# Patient Record
Sex: MALE | Race: OTHER | NOT HISPANIC OR LATINO | URBAN - METROPOLITAN AREA
[De-identification: names, ages, dates, MRNs, and addresses within clinical notes are randomized per-mention and may not be internally consistent; named-entity substitution may affect disease eponyms.]

---

## 2019-11-18 ENCOUNTER — EMERGENCY (EMERGENCY)
Facility: HOSPITAL | Age: 24
LOS: 1 days | Discharge: HOME | End: 2019-11-18
Attending: EMERGENCY MEDICINE
Payer: SELF-PAY

## 2019-11-18 VITALS
RESPIRATION RATE: 18 BRPM | HEIGHT: 69 IN | WEIGHT: 169.98 LBS | SYSTOLIC BLOOD PRESSURE: 132 MMHG | TEMPERATURE: 98 F | HEART RATE: 68 BPM | OXYGEN SATURATION: 99 % | DIASTOLIC BLOOD PRESSURE: 70 MMHG

## 2019-11-18 PROCEDURE — 99053 MED SERV 10PM-8AM 24 HR FAC: CPT

## 2019-11-18 PROCEDURE — 99284 EMERGENCY DEPT VISIT MOD MDM: CPT

## 2019-11-18 NOTE — ED ADULT TRIAGE NOTE - MEANS OF ARRIVAL
Refill policies:    • Allow 2-3 business days for refills; controlled substances may take longer.   • Contact your pharmacy at least 5 days prior to running out of medication and have them send an electronic request or submit request through the [facility] have a procedure or additional testing performed. [facility]) will contact your insurance carrier to obtain pre-certification or prior authorization.     Unfortunately, [facility]UL[facility] has seen an increase in denial of payment even though the p stretcher

## 2019-11-18 NOTE — ED ADULT NURSE NOTE - NSIMPLEMENTINTERV_GEN_ALL_ED
Implemented All Fall Risk Interventions:  [place] to call system. Call bell, personal items and telephone within reach. Instruct patient to call for assistance. Room bathroom lighting operational. Non-slip footwear when patient is off stretcher. Physically safe environment: no spills, clutter or unnecessary equipment. Stretcher in lowest position, wheels locked, appropriate side rails in place. Provide visual cue, wrist band, yellow gown, etc. Monitor gait and stability. Monitor for mental status changes and reorient to person, place, and time. Review medications for side effects contributing to fall risk. Reinforce activity limits and safety measures with patient and family.

## 2019-11-18 NOTE — ED ADULT NURSE NOTE - OBJECTIVE STATEMENT
Patient is a 24 year old male brought in by EMS for public intoxication. Patient was laying across a bench in a restaurant after having several alcoholic beverages, would not get up. Arrived by EMS from restaurant. Patient has history of etoh abuse reports "drinking luis everyday". Patient is A&O x 2 and appears well, ambulatory from EMS stretcher into room. Unaware of today's date and situation. Pt reports "I'm just chilling, I'm lit, not bothering nobody". When asked how much he drinks daily he repeatedly says "I just get lit". Last drink was "few hours ago". Pt is a poor historian. Endorsing no complaints. Abd is soft, non tender, non distended. Skin is warm and dry. Color is consistent with ethnicity. VSS/ NAD. Safety and comfort maintained. No visitors at the bedside. Will continue to monitor.

## 2019-11-19 VITALS
DIASTOLIC BLOOD PRESSURE: 74 MMHG | HEART RATE: 93 BPM | RESPIRATION RATE: 16 BRPM | SYSTOLIC BLOOD PRESSURE: 123 MMHG | TEMPERATURE: 99 F | OXYGEN SATURATION: 100 %

## 2019-11-19 LAB
ALBUMIN SERPL ELPH-MCNC: 5 G/DL — SIGNIFICANT CHANGE UP (ref 3.3–5)
ALP SERPL-CCNC: 74 U/L — SIGNIFICANT CHANGE UP (ref 40–120)
ALT FLD-CCNC: 42 U/L — SIGNIFICANT CHANGE UP (ref 10–45)
ANION GAP SERPL CALC-SCNC: 15 MMOL/L — SIGNIFICANT CHANGE UP (ref 5–17)
APAP SERPL-MCNC: <15 UG/ML — SIGNIFICANT CHANGE UP (ref 10–30)
AST SERPL-CCNC: 45 U/L — HIGH (ref 10–40)
BASE EXCESS BLDV CALC-SCNC: 0.9 MMOL/L — SIGNIFICANT CHANGE UP (ref -2–2)
BILIRUB SERPL-MCNC: 0.2 MG/DL — SIGNIFICANT CHANGE UP (ref 0.2–1.2)
BUN SERPL-MCNC: 10 MG/DL — SIGNIFICANT CHANGE UP (ref 7–23)
CA-I SERPL-SCNC: 1.19 MMOL/L — SIGNIFICANT CHANGE UP (ref 1.12–1.3)
CALCIUM SERPL-MCNC: 9.6 MG/DL — SIGNIFICANT CHANGE UP (ref 8.4–10.5)
CHLORIDE BLDV-SCNC: 110 MMOL/L — HIGH (ref 96–108)
CHLORIDE SERPL-SCNC: 109 MMOL/L — HIGH (ref 96–108)
CO2 BLDV-SCNC: 30 MMOL/L — SIGNIFICANT CHANGE UP (ref 22–30)
CO2 SERPL-SCNC: 23 MMOL/L — SIGNIFICANT CHANGE UP (ref 22–31)
CREAT SERPL-MCNC: 0.91 MG/DL — SIGNIFICANT CHANGE UP (ref 0.5–1.3)
ETHANOL SERPL-MCNC: 394 MG/DL — HIGH (ref 0–10)
GAS PNL BLDV: 153 MMOL/L — HIGH (ref 135–145)
GAS PNL BLDV: SIGNIFICANT CHANGE UP
GAS PNL BLDV: SIGNIFICANT CHANGE UP
GLUCOSE BLDV-MCNC: 79 MG/DL — SIGNIFICANT CHANGE UP (ref 70–99)
GLUCOSE SERPL-MCNC: 84 MG/DL — SIGNIFICANT CHANGE UP (ref 70–99)
HCO3 BLDV-SCNC: 28 MMOL/L — SIGNIFICANT CHANGE UP (ref 21–29)
HCT VFR BLD CALC: 46.4 % — SIGNIFICANT CHANGE UP (ref 39–50)
HCT VFR BLDA CALC: 51 % — HIGH (ref 39–50)
HGB BLD CALC-MCNC: 16.6 G/DL — SIGNIFICANT CHANGE UP (ref 13–17)
HGB BLD-MCNC: 16.3 G/DL — SIGNIFICANT CHANGE UP (ref 13–17)
LACTATE BLDV-MCNC: 2 MMOL/L — SIGNIFICANT CHANGE UP (ref 0.7–2)
MCHC RBC-ENTMCNC: 31.5 PG — SIGNIFICANT CHANGE UP (ref 27–34)
MCHC RBC-ENTMCNC: 35.1 GM/DL — SIGNIFICANT CHANGE UP (ref 32–36)
MCV RBC AUTO: 89.7 FL — SIGNIFICANT CHANGE UP (ref 80–100)
NRBC # BLD: 0 /100 WBCS — SIGNIFICANT CHANGE UP (ref 0–0)
OTHER CELLS CSF MANUAL: 15 ML/DL — LOW (ref 18–22)
PCO2 BLDV: 56 MMHG — HIGH (ref 35–50)
PH BLDV: 7.32 — LOW (ref 7.35–7.45)
PLATELET # BLD AUTO: 237 K/UL — SIGNIFICANT CHANGE UP (ref 150–400)
PO2 BLDV: 41 MMHG — SIGNIFICANT CHANGE UP (ref 25–45)
POTASSIUM BLDV-SCNC: 4.1 MMOL/L — SIGNIFICANT CHANGE UP (ref 3.5–5.3)
POTASSIUM SERPL-MCNC: 4.2 MMOL/L — SIGNIFICANT CHANGE UP (ref 3.5–5.3)
POTASSIUM SERPL-SCNC: 4.2 MMOL/L — SIGNIFICANT CHANGE UP (ref 3.5–5.3)
PROT SERPL-MCNC: 8.6 G/DL — HIGH (ref 6–8.3)
RBC # BLD: 5.17 M/UL — SIGNIFICANT CHANGE UP (ref 4.2–5.8)
RBC # FLD: 12 % — SIGNIFICANT CHANGE UP (ref 10.3–14.5)
SALICYLATES SERPL-MCNC: <2 MG/DL — LOW (ref 15–30)
SAO2 % BLDV: 64 % — LOW (ref 67–88)
SODIUM SERPL-SCNC: 147 MMOL/L — HIGH (ref 135–145)
WBC # BLD: 4.31 K/UL — SIGNIFICANT CHANGE UP (ref 3.8–10.5)
WBC # FLD AUTO: 4.31 K/UL — SIGNIFICANT CHANGE UP (ref 3.8–10.5)

## 2019-11-19 PROCEDURE — 99285 EMERGENCY DEPT VISIT HI MDM: CPT | Mod: 25

## 2019-11-19 PROCEDURE — 82435 ASSAY OF BLOOD CHLORIDE: CPT

## 2019-11-19 PROCEDURE — 70450 CT HEAD/BRAIN W/O DYE: CPT

## 2019-11-19 PROCEDURE — 82803 BLOOD GASES ANY COMBINATION: CPT

## 2019-11-19 PROCEDURE — 72125 CT NECK SPINE W/O DYE: CPT

## 2019-11-19 PROCEDURE — 70450 CT HEAD/BRAIN W/O DYE: CPT | Mod: 26

## 2019-11-19 PROCEDURE — 83605 ASSAY OF LACTIC ACID: CPT

## 2019-11-19 PROCEDURE — 85027 COMPLETE CBC AUTOMATED: CPT

## 2019-11-19 PROCEDURE — 85014 HEMATOCRIT: CPT

## 2019-11-19 PROCEDURE — 72125 CT NECK SPINE W/O DYE: CPT | Mod: 26

## 2019-11-19 PROCEDURE — 84295 ASSAY OF SERUM SODIUM: CPT

## 2019-11-19 PROCEDURE — 80307 DRUG TEST PRSMV CHEM ANLYZR: CPT

## 2019-11-19 PROCEDURE — 82947 ASSAY GLUCOSE BLOOD QUANT: CPT

## 2019-11-19 PROCEDURE — 96361 HYDRATE IV INFUSION ADD-ON: CPT

## 2019-11-19 PROCEDURE — 82962 GLUCOSE BLOOD TEST: CPT

## 2019-11-19 PROCEDURE — 82330 ASSAY OF CALCIUM: CPT

## 2019-11-19 PROCEDURE — 84132 ASSAY OF SERUM POTASSIUM: CPT

## 2019-11-19 PROCEDURE — 96360 HYDRATION IV INFUSION INIT: CPT

## 2019-11-19 PROCEDURE — 80053 COMPREHEN METABOLIC PANEL: CPT

## 2019-11-19 RX ORDER — SODIUM CHLORIDE 9 MG/ML
1000 INJECTION INTRAMUSCULAR; INTRAVENOUS; SUBCUTANEOUS ONCE
Refills: 0 | Status: COMPLETED | OUTPATIENT
Start: 2019-11-19 | End: 2019-11-19

## 2019-11-19 RX ADMIN — SODIUM CHLORIDE 1000 MILLILITER(S): 9 INJECTION INTRAMUSCULAR; INTRAVENOUS; SUBCUTANEOUS at 04:11

## 2019-11-19 RX ADMIN — SODIUM CHLORIDE 1000 MILLILITER(S): 9 INJECTION INTRAMUSCULAR; INTRAVENOUS; SUBCUTANEOUS at 04:04

## 2019-11-19 RX ADMIN — SODIUM CHLORIDE 1000 MILLILITER(S): 9 INJECTION INTRAMUSCULAR; INTRAVENOUS; SUBCUTANEOUS at 01:35

## 2019-11-19 NOTE — ED PROVIDER NOTE - CLINICAL SUMMARY MEDICAL DECISION MAKING FREE TEXT BOX
Gela: 24 year old male bib ems intoxicated in restaurant zapata. admits to drinking. does not recall wahat happened last night. orietned to person. no other drug use per patient. undressed and examined, no trauma on exam. will get labs, ivf, ct head/c-spine as patient is not oriented, and no other historian present. will continue to observe until sober.

## 2019-11-19 NOTE — ED PROVIDER NOTE - OBJECTIVE STATEMENT
23yo male with unknown pmh bibems, found intoxicated in a restaurant zapata.  Patient admits to drinking bashir tonight and states he drinks hard liquor everyday.  When questioned how much patient repetitively answers "I get lit."  He does not recall what happened tonight or how he got to the hospital minutes after being moved from the ems stretcher.  Patient is only oriented to person.  Denies other drug use.  Denies fevers, chills, n/v, headache, syncope, head trauma, cp, sob, abdominal pain.

## 2019-11-19 NOTE — ED PROVIDER NOTE - NSFOLLOWUPINSTRUCTIONS_ED_ALL_ED_FT
You were seen in the Emergency Department for alcohol intoxication.  1) Advance activity as tolerated.    2) Continue all previously prescribed medications as directed.    3) Follow up with your primary care physician in 24-48 hours - take copies of your results.    4) Return to the Emergency Department for worsening or persistent symptoms, and/or ANY NEW OR CONCERNING SYMPTOMS. If you have issues obtaining follow up, please call: [phone] to obtain a doctor or specialist who takes your insurance in your area.

## 2019-11-19 NOTE — ED ADULT NURSE REASSESSMENT NOTE - NS ED NURSE REASSESS COMMENT FT1
Pt following commands, given phone and calling family at this time. Fully undressed with fall precautions in place. A&O x4, resting calmly.

## 2019-11-19 NOTE — ED PROVIDER NOTE - PHYSICAL EXAMINATION
General appearance: Intoxicated, NAD   Eyes: anicteric sclerae, CLARA, EOMI   HENT: Atraumatic; oropharynx clear, MMM and no ulcerations, no pharyngeal erythema or exudate   Neck: Trachea midline; Full range of motion, supple   Pulm: CTA bl, normal respiratory effort and no intercostal retractions, normal work of breathing   CV: RRR, No murmurs, rubs, or gallops.   Abdomen: Soft, non-tender, non-distended; no masses or hepatosplenomegaly.   Extremities: No peripheral edema    Skin: Dry, normal temperature, turgor and texture; no rash   Psych: Alert, oriented to person but not place or time   Neuro: CN2-12 grossly intact, MS +5/5 in UE and LE BL, finger to nose smooth and rapid, gross sensation intact in UE and LE BL, patient has difficulty following commands

## 2019-11-19 NOTE — ED PROVIDER NOTE - PROGRESS NOTE DETAILS
Chel Tanner M.D. Resident: Received patient at [facility], discussed with patient's father, Alexei Adams ([phone]), currently patient answering questions appropriately and feeling better, will talk to  to get patient resources for alcohol addiction and will reevaluate patient for sobriety and discharge Chel Tanner M.D. Resident: Pt reevaluated, clinically sober, ambulatory without assistance, tolerating PO, discussed with , will come see patient to provide resources

## 2019-11-19 NOTE — ED PROVIDER NOTE - PATIENT PORTAL LINK FT
You can access the FollowMyHealth Patient Portal offered by [facility] by registering at the following website: [email or web address]. By joining [employer]’s FollowMyHealth portal, you will also be able to view your health information using other applications (apps) compatible with our system.

## 2019-11-19 NOTE — CHART NOTE - NSCHARTNOTEFT_GEN_A_CORE
EMERGENCY : SW consulted by medical team due to patient being medically cleared for discharge after coming in for alcohol intoxication. Medical team requesting LMSW provide patient with resources due to everyday alcohol use. LMSW met with patient at bedside and introduced self and role to which patient verbalized understanding. Patient alert and oriented x4 at this time. SBIRT completed with a score of 17. Patient states that he often drinks liquor but sometimes drinks beer. He states that when he is drinking he usually only has 1 to 2 drinks and drinks every other day. Patient says he used to drink everyday but cut down because his father was concerned about his everyday drinking. Patient has never been to any inpatient or outpatient providers for his alcohol use. Patient states that he does not feel he needs help right now but was receptive to inpatient and outpatient community resources for alcohol use provided by [facility]. LMSW also provided education to patient on the dangers and effects of frequent alcohol use. Patient verbalized understanding. As per medical team patient’s father notified of discharge as per patient’s request but that his father is busy and cannot pick him up. Patient states he is familiar with the bus system and has a metro card to get home. Patient lives with his father and brother. Patient states he has no other needs or concerns at this time. SW to remain available.